# Patient Record
Sex: FEMALE | Race: ASIAN | NOT HISPANIC OR LATINO | Employment: FULL TIME | URBAN - NONMETROPOLITAN AREA
[De-identification: names, ages, dates, MRNs, and addresses within clinical notes are randomized per-mention and may not be internally consistent; named-entity substitution may affect disease eponyms.]

---

## 2017-03-15 ENCOUNTER — OFFICE VISIT (OUTPATIENT)
Dept: RETAIL CLINIC | Facility: CLINIC | Age: 22
End: 2017-03-15

## 2017-03-15 VITALS
DIASTOLIC BLOOD PRESSURE: 62 MMHG | OXYGEN SATURATION: 98 % | TEMPERATURE: 98.4 F | BODY MASS INDEX: 19.03 KG/M2 | RESPIRATION RATE: 18 BRPM | WEIGHT: 107.4 LBS | HEIGHT: 63 IN | SYSTOLIC BLOOD PRESSURE: 100 MMHG | HEART RATE: 72 BPM

## 2017-03-15 DIAGNOSIS — J40 BRONCHITIS: Primary | ICD-10-CM

## 2017-03-15 PROCEDURE — 99203 OFFICE O/P NEW LOW 30 MIN: CPT | Performed by: NURSE PRACTITIONER

## 2017-03-15 RX ORDER — AZITHROMYCIN 250 MG/1
TABLET, FILM COATED ORAL
Qty: 6 TABLET | Refills: 0 | Status: SHIPPED | OUTPATIENT
Start: 2017-03-15 | End: 2020-09-04

## 2017-03-15 RX ORDER — BENZONATATE 200 MG/1
200 CAPSULE ORAL 2 TIMES DAILY PRN
Qty: 20 CAPSULE | Refills: 0 | Status: SHIPPED | OUTPATIENT
Start: 2017-03-15 | End: 2017-03-25

## 2017-03-15 RX ORDER — DEXTROMETHORPHAN HYDROBROMIDE AND PROMETHAZINE HYDROCHLORIDE 15; 6.25 MG/5ML; MG/5ML
5 SYRUP ORAL NIGHTLY
Qty: 50 ML | Refills: 0 | Status: SHIPPED | OUTPATIENT
Start: 2017-03-15 | End: 2017-03-25

## 2017-03-15 NOTE — PATIENT INSTRUCTIONS
You are diagnosed with bronchitis.   An antibiotic is prescribed for you today that needs to be taken until gone, whether or not you feel better. It is recommended you take a probiotic when taking an antibiotic. Probiotics are found over the counter in pill form and in some yogurt brands, both are recommended. Increase fluid intake. If cough pills or liquid were prescribed, do not take them and drive. Avoid tobacco smoke or other irritants. Use motrin or Tylenol for fever as needed. If your symptoms worsen, wheezing or shortness of air develops or worsens,or other concerns develop, you should follow up with your primary care provider or go to the ER as you may need further diagnostics such as x-ray and blood work. You verbalized an understanding of these instructions and a copy of this visit summary is provided.    MIGUEL Aquino

## 2017-03-15 NOTE — PROGRESS NOTES
"Subjective   Salvador Montes De Oca is a 22 y.o. female.     HPI Comments: Patient reports a 7 day history of cough with occasional burning in chest and green mucous in am. Reports fever but never checked. Feels feverish at this time but has no fever in office. Tried robitussin with no relief. No ill contacts. No other URI symptoms.     Cough   Pertinent negatives include no chills, ear pain, fever, headaches, postnasal drip, sore throat, shortness of breath or wheezing.        No Known Allergies      The following portions of the patient's history were reviewed and updated as appropriate: allergies, current medications, past family history, past medical history, past social history, past surgical history and problem list.    Review of Systems   Constitutional: Negative for chills and fever.   HENT: Negative for congestion, ear pain, postnasal drip and sore throat.    Respiratory: Positive for cough and chest tightness. Negative for shortness of breath and wheezing.    Neurological: Negative for dizziness and headaches.       Objective     Visit Vitals   • /62 (BP Location: Right arm)   • Pulse 72   • Temp 98.4 °F (36.9 °C) (Oral)   • Resp 18   • Ht 63\" (160 cm)   • Wt 107 lb 6.4 oz (48.7 kg)   • LMP 03/06/2017 (Approximate)   • BMI 19.03 kg/m2       Physical Exam  General Appearance:  Well-appearing, well-developed, well hydrated, well nourished, no acute distress, alert and oriented, appears stated age, comfortable, pleasant, cooperative  Head/face:  Normocephalic, atraumatic  Eyes:  Pupils equal, sclera clear, EOMI  Ears:  Bilateral TMs are pearly gray with light reflex and landmarks evident, canals are clear, no pinna or tragus tenderness with palpation  Nose/Sinuses:  Nares patent bilaterally without discharge or lesions  Mouth/Throat:  Posterior pharynx is mildly erythematous with a small amount of clear drainage  Neck:  Supple without lymphadenopathy or thyromegaly; trachea is midline  Lungs:  Scattered rhonchi " bilateral upper lobes  Heart:  Regular rate and rhythm without murmur, gallop or rub  Neurologic:  Alert; no focal deficits; age appropriate behavior and speech      Assessment/Plan   Salvador was seen today for cough.    Diagnoses and all orders for this visit:    Bronchitis    Other orders  -     azithromycin (ZITHROMAX Z-ADRIÁN) 250 MG tablet; Take 2 tablets the first day, then 1 tablet daily for 4 days.  -     benzonatate (TESSALON) 200 MG capsule; Take 1 capsule by mouth 2 (Two) Times a Day As Needed for Cough (in the morning and afternoon as needed) for up to 10 days.  -     promethazine-dextromethorphan (PROMETHAZINE-DM) 6.25-15 MG/5ML syrup; Take 5 mL by mouth Every Night for 10 days.      You are diagnosed with bronchitis.   An antibiotic is prescribed for you today that needs to be taken until gone, whether or not you feel better. It is recommended you take a probiotic when taking an antibiotic. Probiotics are found over the counter in pill form and in some yogurt brands, both are recommended. Increase fluid intake. If cough pills or liquid were prescribed, do not take them and drive. Avoid tobacco smoke or other irritants. Use motrin or Tylenol for fever as needed. If your symptoms worsen, wheezing or shortness of air develops or worsens,or other concerns develop, you should follow up with your primary care provider or go to the ER as you may need further diagnostics such as x-ray and blood work. You verbalized an understanding of these instructions and a copy of this visit summary is provided.           MIGUEL Aquino

## 2020-09-04 ENCOUNTER — OFFICE VISIT (OUTPATIENT)
Dept: FAMILY MEDICINE CLINIC | Facility: CLINIC | Age: 25
End: 2020-09-04

## 2020-09-04 ENCOUNTER — LAB (OUTPATIENT)
Dept: LAB | Facility: HOSPITAL | Age: 25
End: 2020-09-04

## 2020-09-04 VITALS
TEMPERATURE: 97.3 F | SYSTOLIC BLOOD PRESSURE: 98 MMHG | HEART RATE: 60 BPM | WEIGHT: 108.8 LBS | HEIGHT: 62 IN | DIASTOLIC BLOOD PRESSURE: 64 MMHG | OXYGEN SATURATION: 99 % | RESPIRATION RATE: 14 BRPM | BODY MASS INDEX: 20.02 KG/M2

## 2020-09-04 DIAGNOSIS — Z00.00 ROUTINE GENERAL MEDICAL EXAMINATION AT A HEALTH CARE FACILITY: ICD-10-CM

## 2020-09-04 DIAGNOSIS — Z00.00 ROUTINE GENERAL MEDICAL EXAMINATION AT A HEALTH CARE FACILITY: Primary | ICD-10-CM

## 2020-09-04 LAB
ALBUMIN SERPL-MCNC: 4.4 G/DL (ref 3.5–5.2)
ALBUMIN/GLOB SERPL: 1 G/DL
ALP SERPL-CCNC: 47 U/L (ref 39–117)
ALT SERPL W P-5'-P-CCNC: 17 U/L (ref 1–33)
ANION GAP SERPL CALCULATED.3IONS-SCNC: 10.7 MMOL/L (ref 5–15)
AST SERPL-CCNC: 22 U/L (ref 1–32)
BASOPHILS # BLD AUTO: 0.02 10*3/MM3 (ref 0–0.2)
BASOPHILS NFR BLD AUTO: 0.5 % (ref 0–1.5)
BILIRUB SERPL-MCNC: 0.4 MG/DL (ref 0–1.2)
BUN SERPL-MCNC: 13 MG/DL (ref 6–20)
BUN/CREAT SERPL: 19.1 (ref 7–25)
CALCIUM SPEC-SCNC: 9.7 MG/DL (ref 8.6–10.5)
CHLORIDE SERPL-SCNC: 99 MMOL/L (ref 98–107)
CHOLEST SERPL-MCNC: 239 MG/DL (ref 0–200)
CO2 SERPL-SCNC: 26.3 MMOL/L (ref 22–29)
CREAT SERPL-MCNC: 0.68 MG/DL (ref 0.57–1)
DEPRECATED RDW RBC AUTO: 39.5 FL (ref 37–54)
EOSINOPHIL # BLD AUTO: 0.2 10*3/MM3 (ref 0–0.4)
EOSINOPHIL NFR BLD AUTO: 4.6 % (ref 0.3–6.2)
ERYTHROCYTE [DISTWIDTH] IN BLOOD BY AUTOMATED COUNT: 13.8 % (ref 12.3–15.4)
GFR SERPL CREATININE-BSD FRML MDRD: 104 ML/MIN/1.73
GFR SERPL CREATININE-BSD FRML MDRD: 126 ML/MIN/1.73
GLOBULIN UR ELPH-MCNC: 4.5 GM/DL
GLUCOSE SERPL-MCNC: 75 MG/DL (ref 65–99)
HCT VFR BLD AUTO: 41.5 % (ref 34–46.6)
HDLC SERPL-MCNC: 63 MG/DL (ref 40–60)
HGB BLD-MCNC: 13.1 G/DL (ref 12–15.9)
IMM GRANULOCYTES # BLD AUTO: 0.01 10*3/MM3 (ref 0–0.05)
IMM GRANULOCYTES NFR BLD AUTO: 0.2 % (ref 0–0.5)
LDLC SERPL CALC-MCNC: 149 MG/DL (ref 0–100)
LDLC/HDLC SERPL: 2.36 {RATIO}
LYMPHOCYTES # BLD AUTO: 2.05 10*3/MM3 (ref 0.7–3.1)
LYMPHOCYTES NFR BLD AUTO: 47.3 % (ref 19.6–45.3)
MCH RBC QN AUTO: 25.1 PG (ref 26.6–33)
MCHC RBC AUTO-ENTMCNC: 31.6 G/DL (ref 31.5–35.7)
MCV RBC AUTO: 79.7 FL (ref 79–97)
MONOCYTES # BLD AUTO: 0.36 10*3/MM3 (ref 0.1–0.9)
MONOCYTES NFR BLD AUTO: 8.3 % (ref 5–12)
NEUTROPHILS NFR BLD AUTO: 1.69 10*3/MM3 (ref 1.7–7)
NEUTROPHILS NFR BLD AUTO: 39.1 % (ref 42.7–76)
NRBC BLD AUTO-RTO: 0 /100 WBC (ref 0–0.2)
PLATELET # BLD AUTO: 227 10*3/MM3 (ref 140–450)
PMV BLD AUTO: 11.4 FL (ref 6–12)
POTASSIUM SERPL-SCNC: 4.3 MMOL/L (ref 3.5–5.2)
PROT SERPL-MCNC: 8.9 G/DL (ref 6–8.5)
RBC # BLD AUTO: 5.21 10*6/MM3 (ref 3.77–5.28)
SODIUM SERPL-SCNC: 136 MMOL/L (ref 136–145)
TRIGL SERPL-MCNC: 137 MG/DL (ref 0–150)
VLDLC SERPL-MCNC: 27.4 MG/DL (ref 5–40)
WBC # BLD AUTO: 4.33 10*3/MM3 (ref 3.4–10.8)

## 2020-09-04 PROCEDURE — 85025 COMPLETE CBC W/AUTO DIFF WBC: CPT

## 2020-09-04 PROCEDURE — 80053 COMPREHEN METABOLIC PANEL: CPT

## 2020-09-04 PROCEDURE — 36415 COLL VENOUS BLD VENIPUNCTURE: CPT

## 2020-09-04 PROCEDURE — 82306 VITAMIN D 25 HYDROXY: CPT

## 2020-09-04 PROCEDURE — 84443 ASSAY THYROID STIM HORMONE: CPT

## 2020-09-04 PROCEDURE — 99385 PREV VISIT NEW AGE 18-39: CPT | Performed by: PHYSICIAN ASSISTANT

## 2020-09-04 PROCEDURE — 80061 LIPID PANEL: CPT

## 2020-09-04 RX ORDER — CLOTRIMAZOLE 1 G/ML
SOLUTION TOPICAL 2 TIMES DAILY
Qty: 10 ML | Refills: 1 | Status: SHIPPED | OUTPATIENT
Start: 2020-09-04 | End: 2021-05-17

## 2020-09-04 RX ORDER — PRENATAL VIT/IRON FUM/FOLIC AC 27MG-0.8MG
1 TABLET ORAL DAILY
COMMUNITY
End: 2021-05-17

## 2020-09-04 NOTE — PROGRESS NOTES
Subjective   Soedilia Montes De Oca is a 25 y.o. female  Establish Care; Fatigue (Req labs); and Annual Exam      History of Present Illness  Patient new patient 25-year-old female who comes in complaining of preventive medical examination.  No problems or complaints  The following portions of the patient's history were reviewed and updated as appropriate: allergies, current medications, past social history and problem list    Review of Systems   Constitutional: Negative for appetite change, diaphoresis, fatigue and unexpected weight change.   Eyes: Negative for visual disturbance.   Respiratory: Negative for cough, chest tightness and shortness of breath.    Cardiovascular: Negative for chest pain, palpitations and leg swelling.   Gastrointestinal: Negative for diarrhea, nausea and vomiting.   Endocrine: Negative for polydipsia, polyphagia and polyuria.   Skin: Negative for color change and rash.   Neurological: Negative for dizziness, syncope, weakness, light-headedness, numbness and headaches.       Objective     Vitals:    09/04/20 1115   BP: 98/64   Pulse: 60   Resp: 14   Temp: 97.3 °F (36.3 °C)   SpO2: 99%       Physical Exam   Constitutional: She appears well-developed and well-nourished.   Neck: Neck supple. No JVD present. No thyromegaly present.   Cardiovascular: Normal rate, regular rhythm, normal heart sounds, intact distal pulses and normal pulses.   No murmur heard.  Pulmonary/Chest: Effort normal and breath sounds normal. No respiratory distress.   Abdominal: Soft. Bowel sounds are normal. There is no hepatosplenomegaly. There is no tenderness.   Musculoskeletal: She exhibits no edema.   Lymphadenopathy:     She has no cervical adenopathy.   Neurological: No sensory deficit.   Skin: Skin is warm and dry. She is not diaphoretic.   Nursing note and vitals reviewed.      Assessment/Plan     Salvador was seen today for establish care, fatigue and annual exam.    Diagnoses and all orders for this visit:    Routine general  medical examination at a health care facility  -     Comprehensive Metabolic Panel; Future  -     Lipid Panel; Future  -     TSH; Future  -     Vitamin D 25 hydroxy; Future  -     CBC & Differential; Future    Other orders  -     clotrimazole (LOTRIMIN) 1 % external solution; Apply  topically to the appropriate area as directed 2 (Two) Times a Day.    Preventive medicine discussed, diet, exercise, healthy living discussed importance of losing weight exercise

## 2020-09-05 LAB
25(OH)D3 SERPL-MCNC: 25.7 NG/ML (ref 30–100)
TSH SERPL DL<=0.05 MIU/L-ACNC: 2.25 UIU/ML (ref 0.27–4.2)

## 2020-09-15 ENCOUNTER — OFFICE VISIT (OUTPATIENT)
Dept: FAMILY MEDICINE CLINIC | Facility: CLINIC | Age: 25
End: 2020-09-15

## 2020-09-15 VITALS
TEMPERATURE: 97.1 F | HEART RATE: 83 BPM | DIASTOLIC BLOOD PRESSURE: 64 MMHG | BODY MASS INDEX: 19.89 KG/M2 | HEIGHT: 62 IN | RESPIRATION RATE: 14 BRPM | SYSTOLIC BLOOD PRESSURE: 100 MMHG

## 2020-09-15 DIAGNOSIS — E55.9 VITAMIN D DEFICIENCY: Primary | ICD-10-CM

## 2020-09-15 PROCEDURE — 99213 OFFICE O/P EST LOW 20 MIN: CPT | Performed by: PHYSICIAN ASSISTANT

## 2020-09-15 RX ORDER — ERGOCALCIFEROL 1.25 MG/1
50000 CAPSULE ORAL WEEKLY
Qty: 5 CAPSULE | Refills: 2 | Status: SHIPPED | OUTPATIENT
Start: 2020-09-15 | End: 2021-05-17

## 2020-09-15 NOTE — PROGRESS NOTES
Subjective   Sothy Tavon is a 25 y.o. female  Fatigue (F/U on labs.)      History of Present Illness  Patient is a pleasant 25-year-old female who comes in for follow-up fatigue no energy, vitamin D level was decreased based day otherwise blood work looked okay no shortness of breath no chest pain patient states she does not drink a lot of milk and  The following portions of the patient's history were reviewed and updated as appropriate: allergies, current medications, past social history and problem list    Review of Systems   Constitutional: Positive for fatigue. Negative for unexpected weight change.   Respiratory: Negative for cough, chest tightness and shortness of breath.    Cardiovascular: Negative for chest pain, palpitations and leg swelling.   Gastrointestinal: Negative for nausea.   Skin: Negative for color change and rash.   Neurological: Negative for dizziness, syncope, weakness and headaches.       Objective     Vitals:    09/15/20 1102   BP: 100/64   Pulse: 83   Resp: 14   Temp: 97.1 °F (36.2 °C)       Physical Exam  Vitals signs and nursing note reviewed.   Constitutional:       Appearance: She is well-developed.   Neck:      Vascular: No JVD.   Cardiovascular:      Rate and Rhythm: Normal rate and regular rhythm.      Pulses: Normal pulses.      Heart sounds: Normal heart sounds. No murmur.   Pulmonary:      Effort: Pulmonary effort is normal. No respiratory distress.      Breath sounds: Normal breath sounds.   Abdominal:      General: Bowel sounds are normal.      Palpations: Abdomen is soft.      Tenderness: There is no abdominal tenderness.   Skin:     General: Skin is warm and dry.         Assessment/Plan     Salvador was seen today for fatigue.    Diagnoses and all orders for this visit:    Vitamin D deficiency  -     vitamin D (ERGOCALCIFEROL) 1.25 MG (49690 UT) capsule capsule; Take 1 capsule by mouth 1 (One) Time Per Week.    Follow-up after 3 months recheck levels

## 2021-05-17 ENCOUNTER — OFFICE VISIT (OUTPATIENT)
Dept: FAMILY MEDICINE CLINIC | Facility: CLINIC | Age: 26
End: 2021-05-17

## 2021-05-17 VITALS
WEIGHT: 109.8 LBS | BODY MASS INDEX: 19.45 KG/M2 | SYSTOLIC BLOOD PRESSURE: 99 MMHG | HEART RATE: 60 BPM | HEIGHT: 63 IN | RESPIRATION RATE: 16 BRPM | OXYGEN SATURATION: 99 % | TEMPERATURE: 98 F | DIASTOLIC BLOOD PRESSURE: 63 MMHG

## 2021-05-17 DIAGNOSIS — F32.A DEPRESSION, UNSPECIFIED DEPRESSION TYPE: ICD-10-CM

## 2021-05-17 DIAGNOSIS — R53.83 OTHER FATIGUE: ICD-10-CM

## 2021-05-17 DIAGNOSIS — L29.9 PRURITUS OF SCALP: ICD-10-CM

## 2021-05-17 DIAGNOSIS — R63.0 ANOREXIA: ICD-10-CM

## 2021-05-17 DIAGNOSIS — R06.02 SHORTNESS OF BREATH: Primary | ICD-10-CM

## 2021-05-17 DIAGNOSIS — E55.9 VITAMIN D DEFICIENCY: ICD-10-CM

## 2021-05-17 PROCEDURE — 99214 OFFICE O/P EST MOD 30 MIN: CPT | Performed by: FAMILY MEDICINE

## 2021-05-17 RX ORDER — TRIAMCINOLONE ACETONIDE 1 MG/G
CREAM TOPICAL 2 TIMES DAILY
COMMUNITY
End: 2021-05-17 | Stop reason: SDUPTHER

## 2021-05-17 RX ORDER — TRIAMCINOLONE ACETONIDE 1 MG/G
CREAM TOPICAL 2 TIMES DAILY
Qty: 45 G | Refills: 3 | Status: SHIPPED | OUTPATIENT
Start: 2021-05-17

## 2021-05-17 RX ORDER — CABERGOLINE 0.5 MG/1
0.25 TABLET ORAL 2 TIMES WEEKLY
COMMUNITY

## 2021-05-17 RX ORDER — PAROXETINE HYDROCHLORIDE 20 MG/1
TABLET, FILM COATED ORAL
Qty: 30 TABLET | Refills: 3 | Status: SHIPPED | OUTPATIENT
Start: 2021-05-17

## 2021-05-17 NOTE — PROGRESS NOTES
Chief Complaint  Shortness of Breath    Subjective          History of Present Illness  Salvador Montes De Oca presents today as a new patient to get established and for evaluation of shortness of breath.  She has previously been seen by Dr. Abraham Garcia last visit in September 2020.      Patient reports shortness of breath has been going on for 2 years. She states it come and goes. Patient states she does not feel she is getting a full breath. Patient states it has gotten worse in the last couple months. Patient is a .  She denies any history of lung disease including asthma, Covid, or pneumonia.  She is not aware of any lung problems as a child, no known exposure to tuberculosis.  Patient does not smoke or vape.  Patient moved to [place] in 2008 with her family from [place].  Her primary language is [place] but she speaks English very well and reads basic English without difficulty.    Patient does report feelings of depression.  She is concerned about her weight, it is very stable between 108 112 and would like to gain but does not have much of an appetite.  Patient is taking cabergoline used to treat hyperprolactinemia.  Reviewing chart she had labs in September that did show a low vitamin D level and elevated total and LDL cholesterol and an elevated total protein with normal albumin level.    Patient has been  for 3 years, her  also works in the same [employer] in [place].  She would like to get pregnant.  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  She would have an aspiration to go to culinary school.  She does have some girlfriends to spend time with but does not do a lot outside of work.  She feels like going to the mall is more of a chore and preparing food for her family is more of a chore than it should be.  She reports she is having trouble falling asleep but also having a hard time waking up  "today she has decent energy per days not much energy she blames this partially on not liking breakfast.     Patient wants to know if you would refill her triamcinolone cream she using it only when her scalp itches.       Current Outpatient Medications on File Prior to Visit   Medication Sig   • cabergoline (DOSTINEX) 0.5 MG tablet Take 0.25 mg by mouth 2 (Two) Times a Week.   • [DISCONTINUED] triamcinolone (KENALOG) 0.1 % cream Apply  topically to the appropriate area as directed 2 (Two) Times a Day.   • [DISCONTINUED] clotrimazole (LOTRIMIN) 1 % external solution Apply  topically to the appropriate area as directed 2 (Two) Times a Day.   • [DISCONTINUED] prenatal vitamin (prenatal, CLASSIC, vitamin) tablet Take 1 tablet by mouth Daily.   • [DISCONTINUED] vitamin D (ERGOCALCIFEROL) 1.25 MG (46361 UT) capsule capsule Take 1 capsule by mouth 1 (One) Time Per Week.     No current facility-administered medications on file prior to visit.       Objective   Vital Signs:   BP 99/63 (BP Location: Right arm, Patient Position: Sitting, Cuff Size: Adult)   Pulse 60   Temp 98 °F (36.7 °C) (Infrared)   Resp 16   Ht 160 cm (63\")   Wt 49.8 kg (109 lb 12.8 oz)   SpO2 99%   BMI 19.45 kg/m²     Physical Exam  Vitals and nursing note reviewed.   Constitutional:       General: She is not in acute distress.     Appearance: She is well-developed.      Comments: Thin pleasant  female in no acute distress   HENT:      Head: Normocephalic and atraumatic.   Cardiovascular:      Rate and Rhythm: Normal rate and regular rhythm.      Heart sounds: No murmur heard.     Pulmonary:      Effort: Pulmonary effort is normal.      Breath sounds: Normal breath sounds. No wheezing, rhonchi or rales.   Musculoskeletal:         General: Normal range of motion.   Skin:     General: Skin is warm and dry.      Findings: No rash.   Neurological:      Mental Status: She is alert and oriented to person, place, and time.                No results " found for: HGBA1C    Result Review :                       Assessment and Plan    Diagnoses and all orders for this visit:    1. Shortness of breath (Primary)  -     XR Chest PA & Lateral; Future    2. Depression, unspecified depression type  -     Comprehensive Metabolic Panel; Future  -     TSH; Future  -     T4, Free; Future  -     PARoxetine (Paxil) 20 MG tablet; 1/2 daily for 1 week then 1 daily  Dispense: 30 tablet; Refill: 3    3. Other fatigue  -     Comprehensive Metabolic Panel; Future  -     TSH; Future  -     T4, Free; Future  -     CBC & Differential; Future  -     Vitamin B12; Future  -     XR Chest PA & Lateral; Future  -     PARoxetine (Paxil) 20 MG tablet; 1/2 daily for 1 week then 1 daily  Dispense: 30 tablet; Refill: 3    4. Anorexia  -     PARoxetine (Paxil) 20 MG tablet; 1/2 daily for 1 week then 1 daily  Dispense: 30 tablet; Refill: 3    5. Vitamin D deficiency  -     Vitamin D 25 Hydroxy; Future    6. Pruritus of scalp  -     triamcinolone (KENALOG) 0.1 % cream; Apply  topically to the appropriate area as directed 2 (Two) Times a Day.  Dispense: 45 g; Refill: 3    Long discussion about depression and possible anxiety, poor eating habits etc. encouraged to eat something for breakfast every day and take appropriate breaks at work for eating.  Encouraged to find some activities outside of work continue to use masks at work to protect against chemicals, dust, viruses etc. strongly encourage getting Covid vaccine as soon as she can.  Do recommend lab work as ordered above.  Starting on Paxil for depression and possible anxiety and hopefully will help with appetite and help her gain a little weight.    Shortness of breath, getting chest x-ray checking CBC    Medications Discontinued During This Encounter   Medication Reason   • clotrimazole (LOTRIMIN) 1 % external solution *Therapy completed   • prenatal vitamin (prenatal, CLASSIC, vitamin) tablet *Therapy completed   • vitamin D (ERGOCALCIFEROL)  1.25 MG (95708 UT) capsule capsule *Therapy completed   • triamcinolone (KENALOG) 0.1 % cream Reorder         Follow Up     Return in about 4 weeks (around 6/14/2021) for Recheck depression, fatigue and SOA.    Patient was given instructions and counseling regarding her condition or for health maintenance advice. Please see specific information pulled into the AVS if appropriate.

## 2021-05-17 NOTE — PATIENT INSTRUCTIONS
Major Depressive Disorder, Adult  Major depressive disorder (MDD) is a mental health condition. It may also be called clinical depression or unipolar depression. MDD causes symptoms of sadness, hopelessness, and loss of interest in things. These symptoms last most of the day, almost every day, for 2 weeks. MDD can also cause physical symptoms. It can interfere with relationships and with everyday activities, such as work, school, and activities that are usually pleasant.  MDD may be mild, moderate, or severe. It may be single-episode MDD, which happens once, or recurrent MDD, which may occur multiple times.  What are the causes?  The exact cause of this condition is not known. MDD is most likely caused by a combination of things, which may include:  · Your personality traits.  · [place] or conditioned behaviors or thoughts or feelings that reinforce negativity.  · Any alcohol or substance misuse.  · Long-term (chronic) physical or mental health illness.  · Going through a traumatic experience or major life changes.  What increases the risk?  The following factors may make someone more likely to develop MDD:  · A family history of depression.  · Being a woman.  · Troubled family relationships.  · Abnormally low levels of certain brain chemicals.  · Traumatic or painful events in childhood, especially abuse or loss of a parent.  · A lot of stress from life experiences, such as poor living conditions or discrimination.  · Chronic physical illness or other mental health disorders.  What are the signs or symptoms?  The main symptoms of MDD usually include:  · Constant depressed or irritable mood.  · A loss of interest in things and activities.  Other symptoms include:  · Sleeping or eating too much or too little.  · Unexplained weight gain or weight loss.  · Tiredness or low energy.  · Being agitated, restless, or weak.  · Feeling hopeless, worthless, or guilty.  · Trouble thinking clearly or making  decisions.  · Thoughts of suicide or thoughts of harming others.  · Isolating oneself or avoiding other people or activities.  · Trouble completing tasks, work, or any normal obligations.  Severe symptoms of this condition may include:  · Psychotic depression.This may include false beliefs, or delusions. It may also include seeing, hearing, tasting, smelling, or feeling things that are not real (hallucinations).  · Chronic depression or persistent depressive disorder. This is low-level depression that lasts for at least 2 years.  · Melancholic depression, or feeling extremely sad and hopeless.  · Catatonic depression, which includes trouble speaking and trouble moving.  How is this diagnosed?  This condition may be diagnosed based on:  · Your symptoms.  · Your medical and mental health history. You may be asked questions about your lifestyle, including any drug and alcohol use.  · A physical exam.  · Blood tests to rule out other conditions.  MDD is confirmed if you have the following symptoms most of the day, nearly every day, in a 2-week period:  · Either a depressed mood or loss of interest.  · At least four other MDD symptoms.  How is this treated?  This condition is usually treated by mental health professionals, such as psychologists, psychiatrists, and clinical social workers. You may need more than one type of treatment. Treatment may include:  · Psychotherapy, also called talk therapy or counseling. Types of psychotherapy include:  ? Cognitive behavioral therapy (CBT). This teaches you to recognize unhealthy feelings, thoughts, and behaviors, and replace them with positive thoughts and actions.  ? Interpersonal therapy (IPT). This helps you to improve the way you communicate with others or relate to them.  ? Family therapy. This treatment includes members of your family.  · Medicines to treat anxiety and depression. These medicines help to balance the brain chemicals that affect your emotions.  · Lifestyle  changes. You may be asked to:  ? Limit alcohol use and avoid drug use.  ? Get regular exercise.  ? Get plenty of sleep.  ? Make healthy eating choices.  ? Spend more time outdoors.  · Brain stimulation. This may be done if symptoms are very severe and other treatments have not worked. Examples of this treatment are electroconvulsive therapy and transcranial magnetic stimulation.  Follow these instructions at home:  Activity  · Exercise regularly and spend time outdoors.  · Find activities that you enjoy doing, and make time to do them.  · Find healthy ways to manage stress, such as:  ? Meditation or deep breathing.  ? Spending time in nature.  ? Journaling.  · Return to your normal activities as told by your health care provider. Ask your health care provider what activities are safe for you.  Alcohol and drug use  · If you drink alcohol:  ? Limit how much you use to:  § 0-1 drink a day for women who are not pregnant.  § 0-2 drinks a day for men.  ? Be aware of how much alcohol is in your drink. In the [place] one drink equals one 12 oz bottle of beer (355 mL), one 5 oz glass of wine (148 mL), or one 1½ oz glass of hard liquor (44 mL).  ? Discuss your alcohol use with your health care provider. Alcohol can affect any antidepressant medicines you are taking.  · Discuss any drug use with your health care provider.  General instructions    · Take over-the-counter and prescription medicines only as told by your health care provider.  · Eat a healthy diet and get plenty of sleep.  · Consider joining a support group. Your health care provider may be able to recommend one.  · Keep all follow-up visits as told by your health care provider. This is important.  Where to find more information  · National [place] on Mental Illness: www.xi.org  · [place] National [place] of Mental Health: www.nimh.nih.gov  Contact a health care provider if:  · Your symptoms get worse.  · You develop new symptoms.  Get help right away if:  · You  self-harm.  · You have serious thoughts about hurting yourself or others.  · You hallucinate.  If you ever feel like you may hurt yourself or others, or have thoughts about taking your own life, get help right away. Go to your nearest emergency department or:  · Call your local emergency services (911 in the [place]  · Call a suicide crisis helpline, such as the National Suicide Prevention Lifeline at 1-405.612.4482. This is open 24 hours a day in the [place]  · Text the Crisis Text Line at 587270 (in the [place]  Summary  · Major depressive disorder (MDD) is a mental health condition. MDD causes symptoms of sadness, hopelessness, and loss of interest in things. These symptoms last most of the day, almost every day, for 2 weeks.  · The symptoms of MDD can interfere with relationships and with everyday activities.  · Treatments and support are available for people who develop MDD. You may need more than one type of treatment.  · Get help right away if you have serious thoughts about hurting yourself or others.  This information is not intended to replace advice given to you by your health care provider. Make sure you discuss any questions you have with your health care provider.  Document Revised: 11/28/2020 Document Reviewed: 11/28/2020  [employer] Patient Education © 2021 [employer]    Shortness of Breath, Adult  Shortness of breath is when a person has trouble breathing enough air or when a person feels like she or he is having trouble breathing in enough air. Shortness of breath could be a sign of a medical problem.  Follow these instructions at home:    · Pay attention to any changes in your symptoms.  · Do not use any products that contain nicotine or tobacco, such as cigarettes, e-cigarettes, and chewing tobacco.  · Do not smoke. Smoking is a common cause of shortness of breath. If you need help quitting, ask your health care provider.  · Avoid things that can irritate your airways, such  as:  ? Mold.  ? Dust.  ? Air pollution.  ? Chemical fumes.  ? Things that can cause allergy symptoms (allergens), if you have allergies.  · Keep your living space clean and free of mold and dust.  · Rest as needed. Slowly return to your usual activities.  · Take over-the-counter and prescription medicines only as told by your health care provider. This includes oxygen therapy and inhaled medicines.  · Keep all follow-up visits as told by your health care provider. This is important.  Contact a health care provider if:  · Your condition does not improve as soon as expected.  · You have a hard time doing your normal activities, even after you rest.  · You have new symptoms.  Get help right away if:  · Your shortness of breath gets worse.  · You have shortness of breath when you are resting.  · You feel light-headed or you faint.  · You have a cough that is not controlled with medicines.  · You cough up blood.  · You have pain with breathing.  · You have pain in your chest, arms, shoulders, or abdomen.  · You have a fever.  · You cannot walk up stairs or exercise the way that you normally do.  These symptoms may represent a serious problem that is an emergency. Do not wait to see if the symptoms will go away. Get medical help right away. Call your local emergency services (911 in the [place] Do not drive yourself to the hospital.  Summary  · Shortness of breath is when a person has trouble breathing enough air. It can be a sign of a medical problem.  · Avoid things that irritate your lungs, such as smoking, pollution, mold, and dust.  · Pay attention to changes in your symptoms and contact your health care provider if you have a hard time completing daily activities because of shortness of breath.  This information is not intended to replace advice given to you by your health care provider. Make sure you discuss any questions you have with your health care provider.  Document Revised: 05/20/2019 Document Reviewed:  "05/20/2019  [employer] Patient Education © 2021 [employer]    http://APA.org/depression-guideline\"> https://[employer].PushPage\"> http://point-of-care.[employer]/skills/\"> http://point-of-care.[employer].com\">   Managing Depression, Adult  Depression is a mental health condition that affects your thoughts, feelings, and actions. Being diagnosed with depression can bring you relief if you did not know why you have felt or behaved a certain way. It could also leave you feeling overwhelmed with uncertainty about your future. Preparing yourself to manage your symptoms can help you feel more positive about your future.  How to manage lifestyle changes  Managing stress    Stress is your body's reaction to life changes and events, both good and bad. Stress can add to your feelings of depression. Learning to manage your stress can help lessen your feelings of depression.  Try some of the following approaches to reducing your stress (stress reduction techniques):  · Listen to music that you enjoy and that inspires you.  · Try using a meditation dona or take a meditation class.  · Develop a practice that helps you connect with your spiritual self. Walk in nature, pray, or go to a place of [employer].  · Do some deep breathing. To do this, inhale slowly through your nose. Pause at the top of your inhale for a few seconds and then exhale slowly, letting your muscles relax.  · Practice yoga to help relax and work your muscles.  Choose a stress reduction technique that suits your lifestyle and personality. These techniques take time and practice to develop. Set aside 5-15 minutes a day to do them. Therapists can offer training in these techniques. Other things you can do to manage stress include:  · Keeping a stress diary.  · Knowing your limits and saying no when you think something is too much.  · Paying attention to how you react to certain situations. You may not be able to control everything, but " you can change your reaction.  · Adding humor to your life by watching funny films or TV shows.  · Making time for activities that you enjoy and that relax you.    Medicines  Medicines, such as antidepressants, are often a part of treatment for depression.  · Talk with your pharmacist or health care provider about all the medicines, supplements, and herbal products that you take, their possible side effects, and what medicines and other products are safe to take together.  · Make sure to report any side effects you may have to your health care provider.  Relationships  Your health care provider may suggest family therapy, couples therapy, or individual therapy as part of your treatment.  How to recognize changes  Everyone responds differently to treatment for depression. As you recover from depression, you may start to:  · Have more interest in doing activities.  · Feel less hopeless.  · Have more energy.  · Overeat less often, or have a better appetite.  · Have better mental focus.  It is important to recognize if your depression is not getting better or is getting worse. The symptoms you had in the beginning may return, such as:  · Tiredness (fatigue) or low energy.  · Eating too much or too little.  · Sleeping too much or too little.  · Feeling restless, agitated, or hopeless.  · Trouble focusing or making decisions.  · Unexplained physical complaints.  · Feeling irritable, angry, or aggressive.  If you or your family members notice these symptoms coming back, let your health care provider know right away.  Follow these instructions at home:  Activity    · Try to get some form of exercise each day, such as walking, biking, swimming, or lifting weights.  · Practice stress reduction techniques.  · Engage your mind by taking a class or doing some volunteer work.  Lifestyle  · Get the right amount and quality of sleep.  · Cut down on using caffeine, tobacco, alcohol, and other potentially harmful substances.  · Eat a  healthy diet that includes plenty of vegetables, fruits, whole grains, low-fat dairy products, and lean protein. Do not eat a lot of foods that are high in solid fats, added sugars, or salt (sodium).  General instructions  · Take over-the-counter and prescription medicines only as told by your health care provider.  · Keep all follow-up visits as told by your health care provider. This is important.  Where to find support  Talking to others    Friends and family members can be sources of support and guidance. Talk to trusted friends or family members about your condition. Explain your symptoms to them, and let them know that you are working with a health care provider to treat your depression. Tell friends and family members how they also can be helpful.  Finances  · Find appropriate mental health providers that fit with your financial situation.  · Talk with your health care provider about options to get reduced prices on your medicines.  Where to find more information  You can find support in your area from:  · Anxiety and Depression Association of [place] (ADAA): www.adaa.org  · Mental Health [place]: www.mentalhealthamerica.net  · National [place] on Mental Illness: www.xi.org  Contact a health care provider if:  · You stop taking your antidepressant medicines, and you have any of these symptoms:  ? Nausea.  ? Headache.  ? Light-headedness.  ? Chills and body aches.  ? Not being able to sleep (insomnia).  · You or your friends and family think your depression is getting worse.  Get help right away if:  · You have thoughts of hurting yourself or others.  If you ever feel like you may hurt yourself or others, or have thoughts about taking your own life, get help right away. Go to your nearest emergency department or:  · Call your local emergency services (911 in the [place]  · Call a suicide crisis helpline, such as the National Suicide Prevention Lifeline at 1-990.399.9264. This is open 24 hours a day in the  [place]  · Text the Crisis Text Line at 876321 (in the [place]  Summary  · If you are diagnosed with depression, preparing yourself to manage your symptoms is a good way to feel positive about your future.  · Work with your health care provider on a management plan that includes stress reduction techniques, medicines (if applicable), therapy, and healthy lifestyle habits.  · Keep talking with your health care provider about how your treatment is working.  · If you have thoughts about taking your own life, call a suicide crisis helpline or text a crisis text line.  This information is not intended to replace advice given to you by your health care provider. Make sure you discuss any questions you have with your health care provider.  Document Revised: 10/28/2020 Document Reviewed: 10/28/2020  [employer] Patient Education © 2021 [employer]    Fatigue  If you have fatigue, you feel tired all the time and have a lack of energy or a lack of motivation. Fatigue may make it difficult to start or complete tasks because of exhaustion. In general, occasional or mild fatigue is often a normal response to activity or life. However, long-lasting (chronic) or extreme fatigue may be a symptom of a medical condition.  Follow these instructions at home:  General instructions  · Watch your fatigue for any changes.  · Go to bed and get up at the same time every day.  · Avoid fatigue by pacing yourself during the day and getting enough sleep at night.  · Maintain a healthy weight.  Medicines  · Take over-the-counter and prescription medicines only as told by your health care provider.  · Take a multivitamin, if told by your health care provider.   · Do not use herbal or dietary supplements unless they are approved by your health care provider.  Activity    · Exercise regularly, as told by your health care provider.  · Use or practice techniques to help you relax, such as yoga, yunior chi, meditation, or massage therapy.  Eating and  drinking    · Avoid heavy meals in the evening.  · Eat a well-balanced diet, which includes lean proteins, whole grains, plenty of fruits and vegetables, and low-fat dairy products.  · Avoid consuming too much caffeine.  · Avoid the use of alcohol.  · Drink enough fluid to keep your urine pale yellow.  Lifestyle  · Change situations that cause you stress. Try to keep your work and personal schedule in balance.  · Do not use any products that contain nicotine or tobacco, such as cigarettes and e-cigarettes. If you need help quitting, ask your health care provider.  · Do not use drugs.  Contact a health care provider if:  · Your fatigue does not get better.  · You have a fever.  · You suddenly lose or gain weight.  · You have headaches.  · You have trouble falling asleep or sleeping through the night.  · You feel angry, guilty, anxious, or sad.  · You are unable to have a bowel movement (constipation).  · Your skin is dry.  · You have swelling in your legs or another part of your body.  Get help right away if:  · You feel confused.  · Your vision is blurry.  · You feel faint or you pass out.  · You have a severe headache.  · You have severe pain in your abdomen, your back, or the area between your waist and hips (pelvis).  · You have chest pain, shortness of breath, or an irregular or fast heartbeat.  · You are unable to urinate, or you urinate less than normal.  · You have abnormal bleeding, such as bleeding from the rectum, vagina, nose, lungs, or nipples.  · You vomit blood.  · You have thoughts about hurting yourself or others.  If you ever feel like you may hurt yourself or others, or have thoughts about taking your own life, get help right away. You can go to your nearest emergency department or call:  · Your local emergency services (911 in the [place]  · A suicide crisis helpline, such as the National Suicide Prevention Lifeline at 1-853.673.2094. This is open 24 hours a day.  Summary  · If you have fatigue,  you feel tired all the time and have a lack of energy or a lack of motivation.  · Fatigue may make it difficult to start or complete tasks because of exhaustion.  · Long-lasting (chronic) or extreme fatigue may be a symptom of a medical condition.  · Exercise regularly, as told by your health care provider.  · Change situations that cause you stress. Try to keep your work and personal schedule in balance.  This information is not intended to replace advice given to you by your health care provider. Make sure you discuss any questions you have with your health care provider.  Document Revised: 07/08/2020 Document Reviewed: 09/12/2018  [employer] Patient Education © 2021 [employer]

## 2025-03-25 ENCOUNTER — REFERRAL TRIAGE (OUTPATIENT)
Dept: LABOR AND DELIVERY | Facility: HOSPITAL | Age: 30
End: 2025-03-25

## 2025-04-09 ENCOUNTER — PATIENT OUTREACH (OUTPATIENT)
Dept: LABOR AND DELIVERY | Facility: HOSPITAL | Age: 30
End: 2025-04-09

## 2025-04-09 NOTE — OUTREACH NOTE
[facility]  Unable to Reach    Questions/Answers      Flowsheet Row Responses   Pending Outreach Confirm Patient Interest   Call Attempt First   Outcome Left message        Patient does not have Maximo Suresh RN  Maternity Nurse Navigator    4/9/2025, 12:49 EDT

## 2025-05-01 ENCOUNTER — PATIENT OUTREACH (OUTPATIENT)
Dept: LABOR AND DELIVERY | Facility: HOSPITAL | Age: 30
End: 2025-05-01

## 2025-05-01 NOTE — OUTREACH NOTE
[facility]  Unable to Reach    Questions/Answers      Flowsheet Row Responses   Pending Outreach Confirm Patient Interest   Call Attempt Second   Outcome Left message   Unable to reach comments: no myla Suresh RN  Maternity Nurse Navigator    5/1/2025, 15:52 EDT

## 2025-05-06 ENCOUNTER — PATIENT OUTREACH (OUTPATIENT)
Dept: LABOR AND DELIVERY | Facility: HOSPITAL | Age: 30
End: 2025-05-06

## 2025-05-06 NOTE — OUTREACH NOTE
[facility]  Unable to Reach    Questions/Answers      Flowsheet Row Responses   Pending Outreach Confirm Patient Interest   Call Attempt Third   Outcome Left message   Unable to reach comments: no myla Suresh RN  Maternity Nurse Navigator    5/6/2025, 13:06 EDT